# Patient Record
Sex: FEMALE | Race: WHITE | NOT HISPANIC OR LATINO | URBAN - METROPOLITAN AREA
[De-identification: names, ages, dates, MRNs, and addresses within clinical notes are randomized per-mention and may not be internally consistent; named-entity substitution may affect disease eponyms.]

---

## 2022-12-15 ENCOUNTER — HOSPITAL ENCOUNTER (EMERGENCY)
Facility: OTHER | Age: 52
Discharge: HOME | End: 2022-12-15
Attending: EMERGENCY MEDICINE

## 2022-12-15 VITALS
BODY MASS INDEX: 35.79 KG/M2 | HEART RATE: 111 BPM | TEMPERATURE: 98 F | HEIGHT: 70 IN | WEIGHT: 250 LBS | SYSTOLIC BLOOD PRESSURE: 133 MMHG | DIASTOLIC BLOOD PRESSURE: 88 MMHG | RESPIRATION RATE: 18 BRPM | OXYGEN SATURATION: 95 %

## 2022-12-15 DIAGNOSIS — Z11.1 SCREENING-PULMONARY TB: Primary | ICD-10-CM

## 2022-12-15 LAB
CTP QC/QA: YES
SARS-COV-2 RDRP RESP QL NAA+PROBE: NEGATIVE

## 2022-12-15 PROCEDURE — 99283 EMERGENCY DEPT VISIT LOW MDM: CPT | Mod: 25

## 2022-12-15 PROCEDURE — 87635 SARS-COV-2 COVID-19 AMP PRB: CPT | Performed by: EMERGENCY MEDICINE

## 2022-12-16 NOTE — ED PROVIDER NOTES
"     Source of History:      Chief complaint:  PPD Reading (Requesting CXR and COVID test for [facility]. Denies any symptoms.  all other VSS. Currently refusing urine sample stated "I am not pregnant". Stated still have menstrual cycles. )      HPI:  Nelly Jacob is a 52 y.o. female presenting with request for COVID and TB screening so that she may get into the nearby [facility].  No fevers cough chest pain hemoptysis, exposure to TB that she is aware of.  Currently asymptomatic.  Reports has not had prior positive PPD.    This is the extent to the patients complaints today here in the emergency department.    ROS: As per HPI and below:  General: No fever.  No chills.  Eyes: No visual changes.   ENT: No sore throat. No ear pain.  Urinary: No abnormal urination.  MSK: No back pain. No joint pain.   Integument: No rashes or lesions.      Review of patient's allergies indicates:  No Known Allergies    PMH:  As per HPI and below:  History reviewed. No pertinent past medical history.  History reviewed. No pertinent surgical history.    Social History     Tobacco Use    Smoking status: Never    Smokeless tobacco: Never   Substance Use Topics    Alcohol use: Never    Drug use: Never       Physical Exam:    BP (!) 143/84 (BP Location: Left arm, Patient Position: Sitting)   Pulse (!) 112   Temp 98.4 °F (36.9 °C) (Oral)   Resp 18   Ht 5' 10" (1.778 m)   Wt 113.4 kg (250 lb)   LMP  (LMP Unknown)   SpO2 95%   Breastfeeding No   BMI 35.87 kg/m²   Nursing note and vital signs reviewed.  Appearance: No acute distress.  Eyes: No conjunctival injection.  No pallor or icterus  ENT: Normal phonation.  Cardiac:  Heart rate now approximately 100-110.  Normal S1 and S2.  No murmur or rub.  2+ pulses.    Respiratory: Clear to auscultation bilaterally, without wheezes rales or rhonchi.    Musculoskeletal: Good range of motion all joints.  No deformities.  Neck supple.  No meningismus. Neurovascularly " intact.  Skin: No rashes seen.  Good turgor.  No abrasions.  No ecchymoses.  Mental Status:  Alert and oriented x 3.  Appropriate, conversant.    Labs that have been ordered have been independently reviewed and interpreted by myself.    Two-view chest x-ray, independently interpreted by myself, shows no focal infiltrate or effusion.  No signs of active tuberculosis.    MDM/ Differential Dx:    52 y.o. female with request for TB and COVID screening for shelter placement.  These were negative.  Patient was tachycardic, but did not want to stay for evaluation or fluids.  States she is just here to get documentation for the shelter.  Given list of resources for the homeless and documentation stating this.  Stable for discharge                 Diagnostic Impression:    1. Screening-pulmonary TB         ED Disposition Condition    Discharge Stable            ED Prescriptions    None       Follow-up Information       Follow up With Specialties Details Why Contact Info    [facility]  Schedule an appointment as soon as possible for a visit  As needed              John Guerrero II, MD  12/15/22 1923       John Guerrero II, MD  12/15/22 2033

## 2022-12-16 NOTE — ED TRIAGE NOTES
53 yo female reports to ed requesting TB clearance for admission to [facility]. Denies symptoms. When asked for pt to provide a  urine sample the pt became agitated and refused. [id number].